# Patient Record
Sex: MALE | Race: WHITE | Employment: FULL TIME | URBAN - METROPOLITAN AREA
[De-identification: names, ages, dates, MRNs, and addresses within clinical notes are randomized per-mention and may not be internally consistent; named-entity substitution may affect disease eponyms.]

---

## 2019-05-30 ENCOUNTER — ANESTHESIA EVENT (OUTPATIENT)
Dept: SURGERY | Facility: HOSPITAL | Age: 59
DRG: 473 | End: 2019-05-30
Payer: COMMERCIAL

## 2019-06-10 ENCOUNTER — HOSPITAL ENCOUNTER (INPATIENT)
Facility: HOSPITAL | Age: 59
LOS: 1 days | Discharge: HOME | DRG: 473 | End: 2019-06-11
Attending: ORTHOPAEDIC SURGERY | Admitting: ORTHOPAEDIC SURGERY
Payer: COMMERCIAL

## 2019-06-10 ENCOUNTER — APPOINTMENT (OUTPATIENT)
Dept: GENERAL RADIOLOGY | Facility: HOSPITAL | Age: 59
DRG: 473 | End: 2019-06-10
Attending: ORTHOPAEDIC SURGERY
Payer: COMMERCIAL

## 2019-06-10 ENCOUNTER — ANESTHESIA (OUTPATIENT)
Dept: SURGERY | Facility: HOSPITAL | Age: 59
DRG: 473 | End: 2019-06-10
Payer: COMMERCIAL

## 2019-06-10 DIAGNOSIS — M48.02 CERVICAL STENOSIS OF SPINE: ICD-10-CM

## 2019-06-10 PROCEDURE — 85027 COMPLETE CBC AUTOMATED: CPT | Performed by: PHYSICIAN ASSISTANT

## 2019-06-10 PROCEDURE — 0RB30ZZ EXCISION OF CERVICAL VERTEBRAL DISC, OPEN APPROACH: ICD-10-PCS | Performed by: ORTHOPAEDIC SURGERY

## 2019-06-10 PROCEDURE — 94660 CPAP INITIATION&MGMT: CPT

## 2019-06-10 PROCEDURE — 95938 SOMATOSENSORY TESTING: CPT | Performed by: ORTHOPAEDIC SURGERY

## 2019-06-10 PROCEDURE — 95939 C MOTOR EVOKED UPR&LWR LIMBS: CPT | Performed by: ORTHOPAEDIC SURGERY

## 2019-06-10 PROCEDURE — 88300 SURGICAL PATH GROSS: CPT | Performed by: ORTHOPAEDIC SURGERY

## 2019-06-10 PROCEDURE — 01N10ZZ RELEASE CERVICAL NERVE, OPEN APPROACH: ICD-10-PCS | Performed by: ORTHOPAEDIC SURGERY

## 2019-06-10 PROCEDURE — 4A11X4G MONITORING OF PERIPHERAL NERVOUS ELECTRICAL ACTIVITY, INTRAOPERATIVE, EXTERNAL APPROACH: ICD-10-PCS | Performed by: ORTHOPAEDIC SURGERY

## 2019-06-10 PROCEDURE — 88311 DECALCIFY TISSUE: CPT | Performed by: ORTHOPAEDIC SURGERY

## 2019-06-10 PROCEDURE — 0RG20A0 FUSION OF 2 OR MORE CERVICAL VERTEBRAL JOINTS WITH INTERBODY FUSION DEVICE, ANTERIOR APPROACH, ANTERIOR COLUMN, OPEN APPROACH: ICD-10-PCS | Performed by: ORTHOPAEDIC SURGERY

## 2019-06-10 PROCEDURE — 88304 TISSUE EXAM BY PATHOLOGIST: CPT | Performed by: ORTHOPAEDIC SURGERY

## 2019-06-10 PROCEDURE — 0RP104Z REMOVAL OF INTERNAL FIXATION DEVICE FROM CERVICAL VERTEBRAL JOINT, OPEN APPROACH: ICD-10-PCS | Performed by: ORTHOPAEDIC SURGERY

## 2019-06-10 PROCEDURE — 95861 NEEDLE EMG 2 EXTREMITIES: CPT | Performed by: ORTHOPAEDIC SURGERY

## 2019-06-10 PROCEDURE — 95940 IONM IN OPERATNG ROOM 15 MIN: CPT | Performed by: ORTHOPAEDIC SURGERY

## 2019-06-10 PROCEDURE — 5A09357 ASSISTANCE WITH RESPIRATORY VENTILATION, LESS THAN 24 CONSECUTIVE HOURS, CONTINUOUS POSITIVE AIRWAY PRESSURE: ICD-10-PCS | Performed by: ORTHOPAEDIC SURGERY

## 2019-06-10 PROCEDURE — 76000 FLUOROSCOPY <1 HR PHYS/QHP: CPT | Performed by: ORTHOPAEDIC SURGERY

## 2019-06-10 DEVICE — ARCHON PLATE 40MM 2-LEVEL: Type: IMPLANTABLE DEVICE | Site: NECK | Status: FUNCTIONAL

## 2019-06-10 DEVICE — ARCHON SCRW 4.0X15 SLF-TP VAR: Type: IMPLANTABLE DEVICE | Site: NECK | Status: FUNCTIONAL

## 2019-06-10 DEVICE — 1.9-COHERE CERV CAGE 14X16X7 7: Type: IMPLANTABLE DEVICE | Site: NECK | Status: FUNCTIONAL

## 2019-06-10 DEVICE — OSTEOCEL PRO SMALL: Type: IMPLANTABLE DEVICE | Site: NECK | Status: FUNCTIONAL

## 2019-06-10 RX ORDER — PANTOPRAZOLE SODIUM 40 MG/1
40 TABLET, DELAYED RELEASE ORAL
Status: DISCONTINUED | OUTPATIENT
Start: 2019-06-11 | End: 2019-06-11

## 2019-06-10 RX ORDER — ONDANSETRON 2 MG/ML
4 INJECTION INTRAMUSCULAR; INTRAVENOUS AS NEEDED
Status: DISCONTINUED | OUTPATIENT
Start: 2019-06-10 | End: 2019-06-10 | Stop reason: HOSPADM

## 2019-06-10 RX ORDER — MEPERIDINE HYDROCHLORIDE 25 MG/ML
12.5 INJECTION INTRAMUSCULAR; INTRAVENOUS; SUBCUTANEOUS AS NEEDED
Status: DISCONTINUED | OUTPATIENT
Start: 2019-06-10 | End: 2019-06-10 | Stop reason: HOSPADM

## 2019-06-10 RX ORDER — DEXAMETHASONE SODIUM PHOSPHATE 10 MG/ML
10 INJECTION, SOLUTION INTRAMUSCULAR; INTRAVENOUS EVERY 8 HOURS
Status: COMPLETED | OUTPATIENT
Start: 2019-06-10 | End: 2019-06-11

## 2019-06-10 RX ORDER — METOCLOPRAMIDE HYDROCHLORIDE 5 MG/ML
10 INJECTION INTRAMUSCULAR; INTRAVENOUS EVERY 6 HOURS PRN
Status: DISCONTINUED | OUTPATIENT
Start: 2019-06-10 | End: 2019-06-11

## 2019-06-10 RX ORDER — HYDROMORPHONE HYDROCHLORIDE 1 MG/ML
0.4 INJECTION, SOLUTION INTRAMUSCULAR; INTRAVENOUS; SUBCUTANEOUS EVERY 2 HOUR PRN
Status: DISCONTINUED | OUTPATIENT
Start: 2019-06-10 | End: 2019-06-11

## 2019-06-10 RX ORDER — ONDANSETRON 2 MG/ML
4 INJECTION INTRAMUSCULAR; INTRAVENOUS EVERY 4 HOURS PRN
Status: ACTIVE | OUTPATIENT
Start: 2019-06-10 | End: 2019-06-11

## 2019-06-10 RX ORDER — SODIUM PHOSPHATE, DIBASIC AND SODIUM PHOSPHATE, MONOBASIC 7; 19 G/133ML; G/133ML
1 ENEMA RECTAL ONCE AS NEEDED
Status: DISCONTINUED | OUTPATIENT
Start: 2019-06-10 | End: 2019-06-11

## 2019-06-10 RX ORDER — TIZANIDINE 4 MG/1
4 TABLET ORAL 3 TIMES DAILY PRN
Status: DISCONTINUED | OUTPATIENT
Start: 2019-06-10 | End: 2019-06-11

## 2019-06-10 RX ORDER — DIAZEPAM 5 MG/1
5 TABLET ORAL EVERY 6 HOURS PRN
Status: DISCONTINUED | OUTPATIENT
Start: 2019-06-10 | End: 2019-06-11

## 2019-06-10 RX ORDER — SENNOSIDES 8.6 MG
17.2 TABLET ORAL NIGHTLY
Status: DISCONTINUED | OUTPATIENT
Start: 2019-06-10 | End: 2019-06-11

## 2019-06-10 RX ORDER — GABAPENTIN 600 MG/1
600 TABLET ORAL ONCE
Status: COMPLETED | OUTPATIENT
Start: 2019-06-10 | End: 2019-06-10

## 2019-06-10 RX ORDER — SODIUM CHLORIDE 0.9 % (FLUSH) 0.9 %
SYRINGE (ML) INJECTION AS NEEDED
Status: DISCONTINUED | OUTPATIENT
Start: 2019-06-10 | End: 2019-06-10 | Stop reason: HOSPADM

## 2019-06-10 RX ORDER — ATENOLOL 25 MG/1
25 TABLET ORAL
Status: DISCONTINUED | OUTPATIENT
Start: 2019-06-11 | End: 2019-06-11

## 2019-06-10 RX ORDER — DIPHENHYDRAMINE HYDROCHLORIDE 50 MG/ML
25 INJECTION INTRAMUSCULAR; INTRAVENOUS EVERY 4 HOURS PRN
Status: DISCONTINUED | OUTPATIENT
Start: 2019-06-10 | End: 2019-06-11

## 2019-06-10 RX ORDER — MIDAZOLAM HYDROCHLORIDE 1 MG/ML
1 INJECTION INTRAMUSCULAR; INTRAVENOUS EVERY 5 MIN PRN
Status: DISCONTINUED | OUTPATIENT
Start: 2019-06-10 | End: 2019-06-10 | Stop reason: HOSPADM

## 2019-06-10 RX ORDER — HYDROMORPHONE HYDROCHLORIDE 1 MG/ML
0.4 INJECTION, SOLUTION INTRAMUSCULAR; INTRAVENOUS; SUBCUTANEOUS EVERY 5 MIN PRN
Status: DISCONTINUED | OUTPATIENT
Start: 2019-06-10 | End: 2019-06-10 | Stop reason: HOSPADM

## 2019-06-10 RX ORDER — SODIUM CHLORIDE, SODIUM LACTATE, POTASSIUM CHLORIDE, CALCIUM CHLORIDE 600; 310; 30; 20 MG/100ML; MG/100ML; MG/100ML; MG/100ML
INJECTION, SOLUTION INTRAVENOUS CONTINUOUS
Status: DISCONTINUED | OUTPATIENT
Start: 2019-06-10 | End: 2019-06-10 | Stop reason: HOSPADM

## 2019-06-10 RX ORDER — HYDROMORPHONE HYDROCHLORIDE 1 MG/ML
INJECTION, SOLUTION INTRAMUSCULAR; INTRAVENOUS; SUBCUTANEOUS
Status: COMPLETED
Start: 2019-06-10 | End: 2019-06-10

## 2019-06-10 RX ORDER — POLYETHYLENE GLYCOL 3350 17 G/17G
17 POWDER, FOR SOLUTION ORAL DAILY PRN
Status: DISCONTINUED | OUTPATIENT
Start: 2019-06-10 | End: 2019-06-11

## 2019-06-10 RX ORDER — DIPHENHYDRAMINE HCL 25 MG
25 CAPSULE ORAL EVERY 4 HOURS PRN
Status: DISCONTINUED | OUTPATIENT
Start: 2019-06-10 | End: 2019-06-11

## 2019-06-10 RX ORDER — BISACODYL 10 MG
10 SUPPOSITORY, RECTAL RECTAL
Status: DISCONTINUED | OUTPATIENT
Start: 2019-06-10 | End: 2019-06-11

## 2019-06-10 RX ORDER — NALOXONE HYDROCHLORIDE 0.4 MG/ML
80 INJECTION, SOLUTION INTRAMUSCULAR; INTRAVENOUS; SUBCUTANEOUS AS NEEDED
Status: DISCONTINUED | OUTPATIENT
Start: 2019-06-10 | End: 2019-06-10 | Stop reason: HOSPADM

## 2019-06-10 RX ORDER — ACETAMINOPHEN 500 MG
1000 TABLET ORAL ONCE
Status: DISCONTINUED | OUTPATIENT
Start: 2019-06-10 | End: 2019-06-10

## 2019-06-10 RX ORDER — CELECOXIB 200 MG/1
200 CAPSULE ORAL ONCE
Status: COMPLETED | OUTPATIENT
Start: 2019-06-10 | End: 2019-06-10

## 2019-06-10 RX ORDER — HYDROMORPHONE HYDROCHLORIDE 1 MG/ML
0.8 INJECTION, SOLUTION INTRAMUSCULAR; INTRAVENOUS; SUBCUTANEOUS EVERY 2 HOUR PRN
Status: DISCONTINUED | OUTPATIENT
Start: 2019-06-10 | End: 2019-06-11

## 2019-06-10 RX ORDER — HYDROCODONE BITARTRATE AND ACETAMINOPHEN 10; 325 MG/1; MG/1
2 TABLET ORAL EVERY 4 HOURS PRN
Status: DISCONTINUED | OUTPATIENT
Start: 2019-06-10 | End: 2019-06-11

## 2019-06-10 RX ORDER — BACITRACIN 50000 [USP'U]/1
INJECTION, POWDER, LYOPHILIZED, FOR SOLUTION INTRAMUSCULAR AS NEEDED
Status: DISCONTINUED | OUTPATIENT
Start: 2019-06-10 | End: 2019-06-10 | Stop reason: HOSPADM

## 2019-06-10 RX ORDER — SODIUM CHLORIDE, SODIUM LACTATE, POTASSIUM CHLORIDE, CALCIUM CHLORIDE 600; 310; 30; 20 MG/100ML; MG/100ML; MG/100ML; MG/100ML
INJECTION, SOLUTION INTRAVENOUS CONTINUOUS
Status: DISCONTINUED | OUTPATIENT
Start: 2019-06-10 | End: 2019-06-10

## 2019-06-10 RX ORDER — ONDANSETRON 2 MG/ML
4 INJECTION INTRAMUSCULAR; INTRAVENOUS ONCE
Status: COMPLETED | OUTPATIENT
Start: 2019-06-10 | End: 2019-06-10

## 2019-06-10 RX ORDER — CEFAZOLIN SODIUM/WATER 2 G/20 ML
2 SYRINGE (ML) INTRAVENOUS ONCE
Status: COMPLETED | OUTPATIENT
Start: 2019-06-10 | End: 2019-06-10

## 2019-06-10 RX ORDER — HYDROCODONE BITARTRATE AND ACETAMINOPHEN 10; 325 MG/1; MG/1
1 TABLET ORAL EVERY 4 HOURS PRN
Status: DISCONTINUED | OUTPATIENT
Start: 2019-06-10 | End: 2019-06-11

## 2019-06-10 RX ORDER — HYDROMORPHONE HYDROCHLORIDE 1 MG/ML
0.2 INJECTION, SOLUTION INTRAMUSCULAR; INTRAVENOUS; SUBCUTANEOUS EVERY 2 HOUR PRN
Status: DISCONTINUED | OUTPATIENT
Start: 2019-06-10 | End: 2019-06-11

## 2019-06-10 RX ORDER — ACETAMINOPHEN 325 MG/1
650 TABLET ORAL EVERY 4 HOURS PRN
Status: DISCONTINUED | OUTPATIENT
Start: 2019-06-10 | End: 2019-06-11

## 2019-06-10 RX ORDER — CEFAZOLIN SODIUM/WATER 2 G/20 ML
2 SYRINGE (ML) INTRAVENOUS EVERY 8 HOURS
Status: COMPLETED | OUTPATIENT
Start: 2019-06-10 | End: 2019-06-11

## 2019-06-10 RX ORDER — SODIUM CHLORIDE 9 MG/ML
INJECTION, SOLUTION INTRAVENOUS CONTINUOUS
Status: DISCONTINUED | OUTPATIENT
Start: 2019-06-10 | End: 2019-06-11

## 2019-06-10 RX ORDER — DOCUSATE SODIUM 100 MG/1
100 CAPSULE, LIQUID FILLED ORAL 2 TIMES DAILY
Status: DISCONTINUED | OUTPATIENT
Start: 2019-06-10 | End: 2019-06-11

## 2019-06-10 NOTE — INTERVAL H&P NOTE
Pre-op Diagnosis: Cervical stenosis of spine [M48.02]    The above referenced H&P was reviewed by Brett Llanes MD on 6/10/2019, the patient was examined and no significant changes have occurred in the patient's condition since the H&P was performed.   I d

## 2019-06-10 NOTE — ANESTHESIA PREPROCEDURE EVALUATION
PRE-OP EVALUATION    Patient Name: Suzanne Bedolla    Pre-op Diagnosis: Cervical stenosis of spine [M48.02]    Procedure(s):  Cervical 4- Cervical 5 , Cervical 5-Cervical 6, Cervical 6-Cervical 7 revision anterior cervical discectomy fusion with removal of SURGERY PROCEDURE UNLISTED  04/2013    cervical fusion   • TOTAL HIP REPLACEMENT Left 2012     Social History    Tobacco Use      Smoking status: Never Smoker      Smokeless tobacco: Never Used    Alcohol use: Yes      Comment: very rare      Drug use:  Unk

## 2019-06-10 NOTE — BRIEF OP NOTE
Pre-Operative Diagnosis: Cervical stenosis of spine [M48.02]     Post-Operative Diagnosis: Cervical stenosis of spine [M48.02]      Procedure Performed:   Procedure(s):  Cervical 4- Cervical 5 , Cervical 5-Cervical 6, Cervical 6-Cervical 7 revision anterio

## 2019-06-10 NOTE — PROGRESS NOTES
S:   Denies difficulty breathing or swallowing  He denies numbness     Inspection: Awake Alert  No acute distress. Blood pressure 159/84, pulse 76, temperature 98.2 °F (36.8 °C), temperature source Temporal, resp.  rate 10, height 5' 7\" (1.702 m), [facility]

## 2019-06-10 NOTE — PLAN OF CARE
Received pt from pacu at 1200. Up to the BR with SBA. Rates neck pain \"moderate\". Denies need for pain med. MF dressing dry and intact. SEVILLA with good strength. Valeryes  N/T. Family at bedside. Plans home tomorrow afternoon.   Will continue to Psychiatric

## 2019-06-10 NOTE — CONSULTS
[facility] hospitalist initial consult note  PCP PHYSICIAN NONSTAFF  Consulted at the request of Dr. Terri Swanson  Reason for consult medical co-management  HPI 65 yo male with multiple medical problems including but not limited to GERD, Sleep apnea, high blood pressure file    Relationships      Social connections:        Talks on phone: Not on file        Gets together: Not on file        Attends Spiritism service: Not on file        Active member of club or organization: Not on file        Attends meetings of clubs or mass, intraoperative neuromonitoring  -management per spine surgery    LIDIA; per protocol    HT atenolol    GERD no complaint at this time    Preventatie SCDs    Jeanie Coley MD  [facility] hospitalist  [phone]

## 2019-06-11 VITALS
OXYGEN SATURATION: 94 % | SYSTOLIC BLOOD PRESSURE: 126 MMHG | BODY MASS INDEX: 29.93 KG/M2 | WEIGHT: 190.69 LBS | HEIGHT: 67 IN | HEART RATE: 78 BPM | RESPIRATION RATE: 14 BRPM | DIASTOLIC BLOOD PRESSURE: 66 MMHG | TEMPERATURE: 98 F

## 2019-06-11 PROCEDURE — 85027 COMPLETE CBC AUTOMATED: CPT | Performed by: PHYSICIAN ASSISTANT

## 2019-06-11 PROCEDURE — 97535 SELF CARE MNGMENT TRAINING: CPT

## 2019-06-11 PROCEDURE — 97161 PT EVAL LOW COMPLEX 20 MIN: CPT

## 2019-06-11 PROCEDURE — 97165 OT EVAL LOW COMPLEX 30 MIN: CPT

## 2019-06-11 RX ORDER — PSEUDOEPHEDRINE HCL 30 MG
100 TABLET ORAL 2 TIMES DAILY
Qty: 20 CAPSULE | Refills: 0 | Status: SHIPPED | OUTPATIENT
Start: 2019-06-11

## 2019-06-11 RX ORDER — POLYETHYLENE GLYCOL 3350 17 G/17G
17 POWDER, FOR SOLUTION ORAL DAILY PRN
Qty: 119 G | Refills: 0 | Status: SHIPPED | OUTPATIENT
Start: 2019-06-11 | End: 2019-06-18

## 2019-06-11 RX ORDER — DEXAMETHASONE SODIUM PHOSPHATE 4 MG/ML
8 VIAL (ML) INJECTION ONCE
Status: COMPLETED | OUTPATIENT
Start: 2019-06-11 | End: 2019-06-11

## 2019-06-11 RX ORDER — PREDNISONE 20 MG/1
20 TABLET ORAL DAILY
Qty: 5 TABLET | Refills: 0 | Status: SHIPPED | OUTPATIENT
Start: 2019-06-11 | End: 2019-06-16

## 2019-06-11 NOTE — PAYOR COMM NOTE
--------------  CONTINUED STAY REVIEW    Payor: [facility]  Subscriber #:  [id number]  Authorization Number: [id number]    Admit date: 6/10/19  Admit time: 200    Admitting Physician: Sukhi Coughlin MD  Attending Physician:  Sukhi Coughlin MD  Primary C

## 2019-06-11 NOTE — PHYSICAL THERAPY NOTE
PHYSICAL THERAPY QUICK EVALUATION - INPATIENT    Room Number: 383/383-A  Evaluation Date: 6/11/2019  Presenting Problem: S/p C4-C5,C5-C6,C6-C7 revision ACDF with Removal of hardware,Removal of Epidural mass,Intraoperative monitorning on 06/10/19  Physici ASSESSMENT  Upper extremity ROM and strength are within functional limits     Lower extremity ROM is within functional limits     Lower extremity strength is within functional limits     NEUROLOGICAL FINDINGS  Neurological Findings: None present; Discussed recommendations with /    ASSESSMENT   Patient is a 66year old male admitted on 6/10/2019 for : S/p C4-C5,C5-C6,C6-C7 revision ACDF with Removal of hardware,Removal of Epidural mass,Intraoperative monitorning on

## 2019-06-11 NOTE — PLAN OF CARE
A&O x 4. VSS. On RA. Neck pain well controlled with Norco PRN. Microfoam dressing to anterior neck C/D/I. Aspen collar in place. Denies N/T to BUE's. No c/o dysphagia or audible swallowing noted.  Up with stand by assist and walker to bathroom, voiding with

## 2019-06-11 NOTE — RESPIRATORY THERAPY NOTE
LIDIA Equipment Usage Summary :            Set Mode :CPAP          Usage in Hours:10;00          90% Pressure (EPAP) : 6           90% Insp Pressure (IPAP);           AHI : 2.2          Supplemental Oxygen :      LPM

## 2019-06-11 NOTE — OCCUPATIONAL THERAPY NOTE
OCCUPATIONAL THERAPY QUICK EVALUATION - INPATIENT    Room Number: 383/383-A  Evaluation Date: 6/11/2019     Type of Evaluation: Quick Eval  Presenting Problem: s/p C4-7 ACDF revision with removal of hardware C6-7 6/10/19    Physician Order: IP Consult to Madeleine Brunner tingling pre-op, reports now feels resolved.     SUBJECTIVE  \"My right arm feels so much better now\"    OBJECTIVE  Precautions: Spine(Surgical)  Fall Risk: Standard fall risk    WEIGHT BEARING RESTRICTION  Weight Bearing Restriction: None                P Mod I; education on toileting and toilet transfer techniques, performed simulated toileting and transfer to standard height toilet Mod I; education on incorporation of neck protection principles into safe electronics use, grooming, hair washing, IADLs.  Garland Bowens functional limitation presents during this admission.     Patient was able to achieve the following goals:  Patient able to toilet transfer: safely and independently  Patient able to dress lower extremities: safely and independently  Patient/Caregiver able

## 2019-06-11 NOTE — PROGRESS NOTES
POD #1 spine newsletter, guidebook and swallowing precautions provided to patient. Reviewed indications, side effects of pain medication/narcotics and constipation prevention.  Stressed importance of increased fluids/roughage in diet, continued use stool so

## 2019-06-13 NOTE — PROGRESS NOTES
[facility] hospitalist daily note    Patient was seen/examined on 6/11/19    S; no chest pain, no SOB, no abd pain, no nausea/emesis  + passing gas and urinating  Denies dysuria    Medications in Epic    PE    06/11/19  1133   BP: 126/66   Pulse: 78   Resp: 14

## 2019-06-26 NOTE — OPERATIVE REPORT
[facility]    PATIENT'S NAME: Leticia Shanks   ATTENDING PHYSICIAN: Lynne Gibbs M.D. OPERATING PHYSICIAN: Lynne Gibbs M.D.    PATIENT ACCOUNT#:   [de-identified]    LOCATION:  [address] [place] [address]  MEDICAL RECORD #:   [id number]       DATE OF BIRTH:  [birth year] of the left sternocleidomastoid muscle. The neck was sterilely prepped and draped. An incision was made with a #15 blade. Bovie electrocautery was used for hemostasis. Dissection was taken down to the level of the platysma.   Subplatysmal dissection w sent to Pathology for analysis. The foramen were cleared bilaterally. A thorough decompressive discectomy was undertaken. Undercutting technique was used to decompress behind the vertebral bodies. Endplates were prepared. Allograft cage was sized.   A entirely intact when taken at regular intervals. A drain was applied and sewn in with 2-0 nylon. Fascia was reapproximated with figure-of-eight 2-0 Vicryl. A running 3-0 subcuticular stitch was applied. Steri-Strips were applied.   Sterile dressings wer

## (undated) DEVICE — MAXCESS C MODULE

## (undated) DEVICE — 11.1-M5 MULTIMODALITY KIT 5

## (undated) DEVICE — STERILE POLYISOPRENE POWDER-FREE SURGICAL GLOVES: Brand: PROTEXIS

## (undated) DEVICE — BIT DRL 10MM

## (undated) DEVICE — LAMINECTOMY CDS: Brand: MEDLINE INDUSTRIES, INC.

## (undated) DEVICE — 3.0MM PRECISION ROUND

## (undated) DEVICE — KENDALL SCD EXPRESS SLEEVES, THIGH LENGTH, MEDIUM: Brand: KENDALL SCD

## (undated) DEVICE — #15 STERILE STAINLESS BLADE: Brand: STERILE STAINLESS BLADES

## (undated) DEVICE — BANDAGE ROLL,100% COTTON, 6 PLY, LARGE: Brand: KERLIX

## (undated) DEVICE — TRANSPOSAL ULTRAFLEX DUO/QUAD ULTRA CART MANIFOLD

## (undated) DEVICE — Device

## (undated) DEVICE — DRILL SRG OIL CRTDG MAESTRO

## (undated) DEVICE — 3M™ MICROFOAM™ TAPE 1528-4: Brand: 3M™ MICROFOAM™

## (undated) DEVICE — LIGHT HANDLE

## (undated) DEVICE — SOL  .9 1000ML BTL

## (undated) DEVICE — UNDYED BRAIDED (POLYGLACTIN 910), SYNTHETIC ABSORBABLE SUTURE: Brand: COATED VICRYL

## (undated) DEVICE — CAUTERY BLADE 2IN INS E1455

## (undated) DEVICE — PIN DSTRCT 14MM

## (undated) DEVICE — PAD SACRAL SPAN AID

## (undated) DEVICE — DRAPE TABLE COVER 44X90 TC-10

## (undated) DEVICE — STRL PENROSE DRAIN 18" X 1/4": Brand: CARDINAL HEALTH

## (undated) DEVICE — SUTURE VICRYL 2-0 FSL

## (undated) DEVICE — FLOSEAL HEMOSTATIC MATRIX, 5ML: Brand: FLOSEAL HEMOSTATIC MATRIX

## (undated) NOTE — IP AVS SNAPSHOT
Patient Demographics     Address  [address] [place] [id number] Phone  [phone] [facility]  [phone] [facility] E-mail Address  [email or web address]      Emergency Contact(s)     Name Relation Home Work Mobile    [place] Manual ? Place collar back on after showering. ? Wear even when sleeping IF instructed to do so.  ? Exact time (weeks) of brace to be worn to be determined by surgeon; discuss at office visit  ?  Keep neck straight while removing and replacing collar (No bending, ? Support your neck with one pillow keeping it in a neutral position  ? Sleep on back or side avoiding face down position      Diet and Constipation Prevention  ? Soft diet may help if you have discomfort while swallowing.   ? Cold drinks or food may feel m ? Schedule 2 weeks after surgery with surgeon as directed at discharge  ? Schedule one week follow up with Primary Care Physician. Review all medications. When to contact your surgeon  ? Temp > 101F; take your temperature twice a day.   ? Increased lucy Kevin Reyes [id number]  [phone]                  Your medication list      TAKE these medications       Instructions Authorizing Provider Morning Afternoon Evening As Needed   ACETAMINOPHEN EXTRA STRENGTH 500 MG Caps  Gener [id number] 0.9%  NaCl infusion 06/10/19 1607 New Bag      [id number] HYDROcodone-acetaminophen (NORCO)  MG per tab 1 tablet (Or Linked Group #1) 06/10/19 1604 Given      [id number] HYDROcodone-acetaminophen (NORCO)  MG per tab 1 tablet 06/10/19 2 CPAP Settings (Inpatient)       Most Recent Value   Mode  Spontaneous   Interface  Patient provided mask   Mask size  -- [own]   Set CPAP  6         Lab Results Last 24 Hours      CBC, PLATELET; NO DIFFERENTIAL [[id number]] (Abnormal)  Resulted: 06/11/19 05 None      H&P Note    No notes of this type exist for this encounter.         Consults - MD Consult Notes      Consults signed by Don Herrera MD at 6/10/2019  4:26 PM     Author:  Don Herrera MD Service:  — Author Type:  Physician    Filed:  6/10/201 Food insecurity:        Worry: Not on file        Inability: Not on file      Transportation needs:        Medical: Not on file        Non-medical: Not on file    Tobacco Use      Smoking status: Never Smoker      Smokeless tobacco: Never Used    ONEOK Neck dressing in place  Lymph no tender cervical LAD  CV: RRR, nl S1/S2  Pulm: CTA b/l  Abd; soft, not tender, +BS  Ext: no edema  Skin warm, dry  Psych calm, coopeative  Neuro moving all extremities, CN grossly intact    Labs[ID.1]  Recent Labs   Lab 06/1 • High blood pressure    • Neuropathy     right arm   • Sleep apnea    • Visual impairment     contact lenses and glasses       Past Surgical History  Past Surgical History:   Procedure Laterality Date   • ANTERIOR CERVICAL FUSION BG & INST 3 LEVEL N/A 6/1 -   Turning over in bed (including adjusting bedclothes, sheets and blankets)?: None   -   Sitting down on and standing up from a chair with arms (e.g., wheelchair, bedside commode, etc.): None   -   Moving from lying on back to sitting on the side of the completed include AM PAC scores. Based on this evaluation, patient's clinical presentation is stable and overall evaluation complexity is considered low.   PT Discharge Recommendations: Home    PLAN  Patient has been evaluated and presents with no skilled How much help from another person does the patient currently need…  -   Putting on and taking off regular lower body clothing?: None   -   Bathing (including washing, rinsing, drying)?: None  -   Toileting, which includes using toilet, bedpan or urinal? : transfers and dynamic reaching safely, without loss of balance, and at supervision to modified independent level; patient reports will have supervision at home from wife, daughter and great-niece.  Patient also with good recall and return demo following spi 7/23/2019 8:00 AM Jean Paul Ayoub  [facility]

## (undated) NOTE — IP AVS SNAPSHOT
[address]            (For Outpatient Use Only) Initial Admit Date: 6/10/2019   Inpt/Obs Admit Date: Inpt: 6/10/19 / Obs: N/A   Discharge Date:    Twila Giles:  [de-identified]   MRN: [de-identified]   CSN: [id number]   CEID: [id number] Hospital Account Financial Class: [facility] [place]    June 11, 2019